# Patient Record
Sex: MALE | Race: WHITE | NOT HISPANIC OR LATINO | Employment: FULL TIME | URBAN - METROPOLITAN AREA
[De-identification: names, ages, dates, MRNs, and addresses within clinical notes are randomized per-mention and may not be internally consistent; named-entity substitution may affect disease eponyms.]

---

## 2021-11-29 ENCOUNTER — OFFICE VISIT (OUTPATIENT)
Dept: URGENT CARE | Facility: CLINIC | Age: 56
End: 2021-11-29
Payer: OTHER GOVERNMENT

## 2021-11-29 VITALS
SYSTOLIC BLOOD PRESSURE: 102 MMHG | OXYGEN SATURATION: 95 % | TEMPERATURE: 98 F | BODY MASS INDEX: 24.65 KG/M2 | WEIGHT: 166.4 LBS | HEIGHT: 69 IN | HEART RATE: 83 BPM | DIASTOLIC BLOOD PRESSURE: 70 MMHG | RESPIRATION RATE: 16 BRPM

## 2021-11-29 DIAGNOSIS — S01.81XA LACERATION OF FOREHEAD, INITIAL ENCOUNTER: ICD-10-CM

## 2021-11-29 PROBLEM — C80.1 MALIGNANT NEOPLASM (HCC): Status: ACTIVE | Noted: 2021-11-29

## 2021-11-29 PROCEDURE — 12011 RPR F/E/E/N/L/M 2.5 CM/<: CPT | Performed by: NURSE PRACTITIONER

## 2021-11-29 NOTE — PROGRESS NOTES
Chief Complaint   Patient presents with   • Facial Laceration     Hit forhead on an overhead storage in the airplane and cut the forehead.  Current on Tdap vaccine. Just retired from the .       HISTORY OF PRESENT ILLNESS: Patient is a pleasant 56 y.o. male who presents to urgent care today with concerns of a forehead laceration.  Notes that he excellently hit his forehead on the edge of a cabinet.  This occurred approximately 1.5 hours prior to clinic arrival.  He came straight to urgent care for evaluation and care.  Pain is minimal.  Denies any loss of consciousness, neck pain, changes in vision, nausea, or other complaints.  His tetanus is up-to-date.  Denies other areas of injury or trauma.  He lives in [place].    Patient Active Problem List    Diagnosis Date Noted   • Malignant neoplasm (HCC) 11/29/2021       Allergies:Imiquimod and Ciprofloxacin    No current Epic-ordered outpatient medications on file.     No current Epic-ordered facility-administered medications on file.       History reviewed. No pertinent past medical history.    Social History     Tobacco Use   • Smoking status: Never Smoker   • Smokeless tobacco: Never Used   Vaping Use   • Vaping Use: Never used   Substance Use Topics   • Alcohol use: Not Currently   • Drug use: Never       No family status information on file.   History reviewed. No pertinent family history.    ROS:  Review of Systems   Constitutional: Negative for fever, chills, weight loss, malaise, and fatigue.   HENT: Negative for ear pain, nosebleeds, congestion, sore throat and neck pain.    Eyes: Negative for vision changes.   Neuro: Negative for headache, sensory changes, weakness, seizure, LOC.   Cardiovascular: Negative for chest pain, palpitations, orthopnea and leg swelling.   Respiratory: Negative for cough, sputum production, shortness of breath and wheezing.   Gastrointestinal: Negative for abdominal pain, nausea, vomiting or diarrhea.   Genitourinary:  "Negative for dysuria, urgency and frequency.  Musculoskeletal: Negative for falls, neck pain, back pain, joint pain, myalgias.   Skin: Positive for laceration.  Negative for rash, diaphoresis.     Exam:  /70 (BP Location: Left arm, Patient Position: Sitting, BP Cuff Size: Adult)   Pulse 83   Temp 36.7 °C (98 °F) (Temporal)   Resp 16   Ht 1.753 m (5' 9\")   Wt 75.5 kg (166 lb 6.4 oz)   SpO2 95%   General: well-nourished, well-developed male in NAD  Head: normocephalic  Eyes: PERRLA, no conjunctival injection, acuity grossly intact, lids normal.  Ears: normal shape and symmetry, no tenderness, no discharge. External canals are without any significant edema or erythema. Tympanic membranes are without any inflammation, no effusion. Gross auditory acuity is intact.  Nose: symmetrical without tenderness, no discharge.  Mouth/Throat: reasonable hygiene, no erythema, exudates or tonsillar enlargement.  Neck: no masses, range of motion within normal limits, no tracheal deviation. No obvious thyroid enlargement.   Lymph: no cervical adenopathy. No supraclavicular adenopathy.   Neuro: alert and oriented. Cranial nerves 1-12 grossly intact. No sensory deficit.   Cardiovascular: regular rate and rhythm. No edema.  Pulmonary: no distress. Chest is symmetrical with respiration, no wheezes, crackles, or rhonchi.   Musculoskeletal: no clubbing, appropriate muscle tone, gait is stable.  Skin: warm, no clubbing, no cyanosis, no rashes.  There is a 2 cm partial-thickness linear laceration noted to mid forehead, at hairline.  Edges are well approximated, no foreign bodies, bleeding is controlled.  No bony abnormalities or tenderness.  Psych: appropriate mood, affect, judgement.       Laceration Repair Procedure Note      Indication: Forehead laceration     Procedure: Risks including bleeding, nerve damage, infection, and poor cosmetic outcome discussed at length. Benefits and alternatives discussed. The patient was placed in " the appropriate position and anesthesia around the laceration was obtained by infiltration using 2% % Lidocaine with epinephrine. The area was then cleansed with betadine and draped in a sterile fashion and irrigated with normal saline. The laceration was closed with #4 6-0 Nylon using interrupted sutures with good wound approximation. There were no additional lacerations requiring repair. The wound area was then dressed with bacitracin.       Total repaired wound length:  2 cm     Other Items: Suture count: 4     The patient tolerated the procedure well.     Complications: None        Assessment/Plan:  1. Laceration of forehead, initial encounter         Laceration repaired in clinic.  Wound care discussed.  Instructed to follow-up at home for suture removal, approximately 5 to 7 days.  Supportive care, differential diagnoses, and indications for immediate follow-up discussed with patient.   Pathogenesis of diagnosis discussed including typical length and natural progression.   Instructed to return to clinic or nearest emergency department for any change in condition, further concerns, or worsening of symptoms.  Patient states understanding of the plan of care and discharge instructions.          Please note that this dictation was created using voice recognition software. I have made every reasonable attempt to correct obvious errors, but I expect that there are errors of grammar and possibly content that I did not discover before finalizing the note.      BARB Elaine.